# Patient Record
Sex: FEMALE | Race: WHITE | NOT HISPANIC OR LATINO | Employment: UNEMPLOYED | URBAN - METROPOLITAN AREA
[De-identification: names, ages, dates, MRNs, and addresses within clinical notes are randomized per-mention and may not be internally consistent; named-entity substitution may affect disease eponyms.]

---

## 2018-02-20 ENCOUNTER — CONVERSION ENCOUNTER (OUTPATIENT)
Dept: CARDIOLOGY | Facility: CLINIC | Age: 48
End: 2018-02-20

## 2018-02-27 ENCOUNTER — HOSPITAL ENCOUNTER (OUTPATIENT)
Dept: CARDIOLOGY | Facility: HOSPITAL | Age: 48
Discharge: HOME | End: 2018-02-27
Attending: INTERNAL MEDICINE | Admitting: INTERNAL MEDICINE

## 2018-05-11 ENCOUNTER — CONVERSION ENCOUNTER (OUTPATIENT)
Dept: CARDIOLOGY | Facility: CLINIC | Age: 48
End: 2018-05-11

## 2019-02-21 ENCOUNTER — CONVERSION ENCOUNTER (OUTPATIENT)
Dept: CARDIOLOGY | Facility: CLINIC | Age: 49
End: 2019-02-21

## 2019-06-04 VITALS
HEART RATE: 95 BPM | DIASTOLIC BLOOD PRESSURE: 93 MMHG | BODY MASS INDEX: 35.94 KG/M2 | SYSTOLIC BLOOD PRESSURE: 132 MMHG | WEIGHT: 229 LBS | SYSTOLIC BLOOD PRESSURE: 138 MMHG | HEART RATE: 75 BPM | OXYGEN SATURATION: 98 % | WEIGHT: 229.75 LBS | OXYGEN SATURATION: 98 % | WEIGHT: 229.5 LBS | DIASTOLIC BLOOD PRESSURE: 86 MMHG | DIASTOLIC BLOOD PRESSURE: 90 MMHG | OXYGEN SATURATION: 96 % | BODY MASS INDEX: 35.87 KG/M2 | HEART RATE: 85 BPM | SYSTOLIC BLOOD PRESSURE: 139 MMHG | HEIGHT: 67 IN | BODY MASS INDEX: 36.06 KG/M2

## 2020-02-26 RX ORDER — CETIRIZINE HYDROCHLORIDE 10 MG/1
TABLET ORAL EVERY 24 HOURS
COMMUNITY
Start: 2018-02-20

## 2020-02-26 RX ORDER — FLUTICASONE PROPIONATE 50 MCG
SPRAY, SUSPENSION (ML) NASAL
COMMUNITY
Start: 2018-02-20

## 2020-02-28 ENCOUNTER — OFFICE VISIT (OUTPATIENT)
Dept: CARDIOLOGY | Facility: CLINIC | Age: 50
End: 2020-02-28

## 2020-02-28 VITALS
WEIGHT: 222 LBS | DIASTOLIC BLOOD PRESSURE: 92 MMHG | OXYGEN SATURATION: 98 % | HEART RATE: 82 BPM | BODY MASS INDEX: 36.99 KG/M2 | SYSTOLIC BLOOD PRESSURE: 141 MMHG | HEIGHT: 65 IN

## 2020-02-28 DIAGNOSIS — Z76.89 ENCOUNTER TO ESTABLISH CARE WITH NEW DOCTOR: Primary | ICD-10-CM

## 2020-02-28 DIAGNOSIS — R00.2 PALPITATIONS: ICD-10-CM

## 2020-02-28 DIAGNOSIS — E78.5 DYSLIPIDEMIA: Primary | ICD-10-CM

## 2020-02-28 PROCEDURE — 93000 ELECTROCARDIOGRAM COMPLETE: CPT | Performed by: INTERNAL MEDICINE

## 2020-02-28 PROCEDURE — 99213 OFFICE O/P EST LOW 20 MIN: CPT | Performed by: INTERNAL MEDICINE

## 2021-02-22 ENCOUNTER — TELEPHONE (OUTPATIENT)
Dept: CARDIOLOGY | Facility: CLINIC | Age: 51
End: 2021-02-22

## 2021-02-22 NOTE — TELEPHONE ENCOUNTER
[facility] lipid orders, called pt she will go to [facility] in [place].   Faxed orders.  Transferred to scheduling to reschedule her appt.     appt Mon.3/29

## 2021-03-17 DIAGNOSIS — R00.2 PALPITATIONS: ICD-10-CM

## 2021-03-17 DIAGNOSIS — E78.5 DYSLIPIDEMIA: Primary | ICD-10-CM

## 2021-03-18 LAB
ALBUMIN SERPL-MCNC: 4.3 G/DL (ref 3.8–4.8)
ALBUMIN/GLOB SERPL: 1.6 {RATIO} (ref 1.2–2.2)
ALP SERPL-CCNC: 78 IU/L (ref 39–117)
ALT SERPL-CCNC: 22 IU/L (ref 0–32)
AMBIG ABBREV CMP14 DEFAULT: NORMAL
AMBIG ABBREV LP DEFAULT: NORMAL
AST SERPL-CCNC: 16 IU/L (ref 0–40)
BILIRUB SERPL-MCNC: 0.3 MG/DL (ref 0–1.2)
BUN SERPL-MCNC: 11 MG/DL (ref 6–24)
BUN/CREAT SERPL: 19 (ref 9–23)
CALCIUM SERPL-MCNC: 9.4 MG/DL (ref 8.7–10.2)
CHLORIDE SERPL-SCNC: 104 MMOL/L (ref 96–106)
CHOLEST SERPL-MCNC: 276 MG/DL (ref 100–199)
CO2 SERPL-SCNC: 24 MMOL/L (ref 20–29)
CREAT SERPL-MCNC: 0.59 MG/DL (ref 0.57–1)
GLOBULIN SER CALC-MCNC: 2.7 G/DL (ref 1.5–4.5)
GLUCOSE SERPL-MCNC: 96 MG/DL (ref 65–99)
HDLC SERPL-MCNC: 48 MG/DL
LDLC SERPL CALC-MCNC: 209 MG/DL (ref 0–99)
POTASSIUM SERPL-SCNC: 4.4 MMOL/L (ref 3.5–5.2)
PROT SERPL-MCNC: 7 G/DL (ref 6–8.5)
SODIUM SERPL-SCNC: 143 MMOL/L (ref 134–144)
TRIGL SERPL-MCNC: 107 MG/DL (ref 0–149)
VLDLC SERPL CALC-MCNC: 19 MG/DL (ref 5–40)

## 2021-03-29 ENCOUNTER — OFFICE VISIT (OUTPATIENT)
Dept: CARDIOLOGY | Facility: CLINIC | Age: 51
End: 2021-03-29

## 2021-03-29 VITALS
DIASTOLIC BLOOD PRESSURE: 86 MMHG | TEMPERATURE: 98 F | OXYGEN SATURATION: 96 % | BODY MASS INDEX: 33.99 KG/M2 | HEIGHT: 65 IN | HEART RATE: 97 BPM | WEIGHT: 204 LBS | SYSTOLIC BLOOD PRESSURE: 146 MMHG

## 2021-03-29 DIAGNOSIS — I10 ESSENTIAL HYPERTENSION: ICD-10-CM

## 2021-03-29 DIAGNOSIS — R00.2 PALPITATIONS: Primary | ICD-10-CM

## 2021-03-29 DIAGNOSIS — F17.200 SMOKER: ICD-10-CM

## 2021-03-29 DIAGNOSIS — E78.5 DYSLIPIDEMIA: ICD-10-CM

## 2021-03-29 DIAGNOSIS — E66.9 OBESITY (BMI 30-39.9): ICD-10-CM

## 2021-03-29 PROCEDURE — 99214 OFFICE O/P EST MOD 30 MIN: CPT | Performed by: INTERNAL MEDICINE

## 2021-03-29 PROCEDURE — 93000 ELECTROCARDIOGRAM COMPLETE: CPT | Performed by: INTERNAL MEDICINE

## 2021-03-29 RX ORDER — ERGOCALCIFEROL (VITAMIN D2) 10 MCG
400 TABLET ORAL DAILY
COMMUNITY
End: 2023-04-05

## 2021-03-29 RX ORDER — MULTIVIT WITH MINERALS/LUTEIN
250 TABLET ORAL DAILY
COMMUNITY

## 2021-03-29 RX ORDER — METOPROLOL SUCCINATE 25 MG/1
25 TABLET, EXTENDED RELEASE ORAL DAILY
Qty: 90 TABLET | Refills: 3 | Status: SHIPPED | OUTPATIENT
Start: 2021-03-29 | End: 2022-04-06 | Stop reason: SDUPTHER

## 2022-03-30 ENCOUNTER — TELEPHONE (OUTPATIENT)
Dept: CARDIOLOGY | Facility: CLINIC | Age: 52
End: 2022-03-30

## 2022-03-31 LAB
ALBUMIN SERPL-MCNC: 4.4 G/DL (ref 3.8–4.8)
ALBUMIN/GLOB SERPL: 1.7 {RATIO} (ref 1.2–2.2)
ALP SERPL-CCNC: 73 IU/L (ref 44–121)
ALT SERPL-CCNC: 18 IU/L (ref 0–32)
AST SERPL-CCNC: 13 IU/L (ref 0–40)
BILIRUB SERPL-MCNC: 0.2 MG/DL (ref 0–1.2)
BUN SERPL-MCNC: 7 MG/DL (ref 6–24)
BUN/CREAT SERPL: 10 (ref 9–23)
CALCIUM SERPL-MCNC: 9.6 MG/DL (ref 8.7–10.2)
CHLORIDE SERPL-SCNC: 103 MMOL/L (ref 96–106)
CHOLEST SERPL-MCNC: 261 MG/DL (ref 100–199)
CO2 SERPL-SCNC: 22 MMOL/L (ref 20–29)
CREAT SERPL-MCNC: 0.67 MG/DL (ref 0.57–1)
EGFRCR SERPLBLD CKD-EPI 2021: 107 ML/MIN/1.73
GLOBULIN SER CALC-MCNC: 2.6 G/DL (ref 1.5–4.5)
GLUCOSE SERPL-MCNC: 97 MG/DL (ref 65–99)
HDLC SERPL-MCNC: 54 MG/DL
LDLC SERPL CALC-MCNC: 191 MG/DL (ref 0–99)
POTASSIUM SERPL-SCNC: 5.1 MMOL/L (ref 3.5–5.2)
PROT SERPL-MCNC: 7 G/DL (ref 6–8.5)
SODIUM SERPL-SCNC: 141 MMOL/L (ref 134–144)
TRIGL SERPL-MCNC: 94 MG/DL (ref 0–149)
VLDLC SERPL CALC-MCNC: 16 MG/DL (ref 5–40)

## 2022-04-06 ENCOUNTER — OFFICE VISIT (OUTPATIENT)
Dept: CARDIOLOGY | Facility: CLINIC | Age: 52
End: 2022-04-06

## 2022-04-06 VITALS
SYSTOLIC BLOOD PRESSURE: 162 MMHG | HEIGHT: 65 IN | OXYGEN SATURATION: 97 % | HEART RATE: 78 BPM | WEIGHT: 209 LBS | DIASTOLIC BLOOD PRESSURE: 84 MMHG | BODY MASS INDEX: 34.82 KG/M2

## 2022-04-06 DIAGNOSIS — I10 ESSENTIAL HYPERTENSION: ICD-10-CM

## 2022-04-06 DIAGNOSIS — E78.5 DYSLIPIDEMIA: Primary | ICD-10-CM

## 2022-04-06 DIAGNOSIS — E66.9 OBESITY (BMI 30-39.9): ICD-10-CM

## 2022-04-06 DIAGNOSIS — F17.200 SMOKER: ICD-10-CM

## 2022-04-06 PROCEDURE — 93000 ELECTROCARDIOGRAM COMPLETE: CPT | Performed by: INTERNAL MEDICINE

## 2022-04-06 PROCEDURE — 99214 OFFICE O/P EST MOD 30 MIN: CPT | Performed by: INTERNAL MEDICINE

## 2022-04-06 RX ORDER — METOPROLOL SUCCINATE 25 MG/1
25 TABLET, EXTENDED RELEASE ORAL DAILY
Qty: 90 TABLET | Refills: 3 | Status: SHIPPED | OUTPATIENT
Start: 2022-04-06 | End: 2023-03-30

## 2022-04-06 RX ORDER — ATORVASTATIN CALCIUM 10 MG/1
10 TABLET, FILM COATED ORAL DAILY
Qty: 90 TABLET | Refills: 3 | Status: SHIPPED | OUTPATIENT
Start: 2022-04-06 | End: 2023-03-20

## 2022-04-06 NOTE — PROGRESS NOTES
Subjective:     Encounter Date:04/06/2022      Patient ID: Ajay Hoffman is a 51 y.o. female.    Chief Complaint : Follow-up for hypertension, hyperlipidemia, obesity with BMI over 30  History of Present Illness      Ms. Ajay Hoffman has PMH    # Palpitation  #Hypertension,  # hyperlipidemia  #  allergy to penicillin and sulfa  #  cigarette smoker  #  tonsillectomy and tummy tuck           here for 1 year follow-up..  Patient denies any chest pain or shortness of breath.    Patient's arterial blood pressure is 162/84, heart rate 78, O2 sat of 97% on room air.  Patient says her blood pressure at home runs in the 120s.  Patient had echocardiogram 2/27/2018 which was normal and treadmill stress test which was negative. 1 month event monitor was unremarkable.  Continues to smoke in spite of counseling  Labs from 3/12/2021 reveal cholesterol 276 triglycerides 107 HDL 48 , CMP normal.  Labs from 3/30/2022 reveal CMP which was normal.  Lipid profile with cholesterol 261, triglycerides 94, HDL 54, .     ASSESSMENT:    -Dyslipidemia  -Obesity with BMI over 30  -  cigarette smoker  - hypertension    PLAN:  Reviewed EKG results with patient  Continue beta-blockers for blood pressure control and palpitations   counseled on smoking cessation.  Reviewed patient's elevated BMI over 30..  Counseled on weight loss diet and exercise.  We will add statins since patient has multiple risk factors.  Risk benefits alternatives explained.  We will check lipid profile and CMP before next visit.  Patient brought home blood pressure readings and reviewed those and they are running good.              ECG 12 Lead    Date/Time: 4/6/2022 5:21 PM  Performed by: Alejandro Shepard MD  Authorized by: Alejandro Shepard MD   Comparison: compared with previous ECG from 3/25/2021  Comparison to previous ECG: EKG done today reviewed/interpreted by me reveals sinus rhythm with rate of 73 bpm with no  new changes compared to EKG from 3/25/2021              The following portions of the patient's history were reviewed and updated as appropriate: allergies, current medications, past family history, past medical history, past social history, past surgical history and problem list.    Assessment:         MDM     Diagnosis Plan   1. Dyslipidemia  Comprehensive Metabolic Panel    Lipid Panel   2. Essential hypertension  Comprehensive Metabolic Panel    Lipid Panel   3. Obesity (BMI 30-39.9)  Comprehensive Metabolic Panel    Lipid Panel   4. Smoker  Comprehensive Metabolic Panel    Lipid Panel          Plan:               Past Medical History:  Past Medical History:   Diagnosis Date   • Dyslipidemia    • Palpitation      Past Surgical History:  Past Surgical History:   Procedure Laterality Date   • COSMETIC SURGERY      tummy tuck   • TONSILLECTOMY        Allergies:  Allergies   Allergen Reactions   • Penicillin G Rash   • Sulfa Antibiotics Rash     Home Meds:  Current Meds:     Current Outpatient Medications:   •  cetirizine (zyrTEC) 10 MG tablet, Daily., Disp: , Rfl:   •  CINNAMON PO, Take  by mouth., Disp: , Rfl:   •  fluticasone (FLONASE) 50 MCG/ACT nasal spray, FLONASE ALLERGY RELIEF 50 MCG/ACT SUSP, Disp: , Rfl:   •  metoprolol succinate XL (TOPROL-XL) 25 MG 24 hr tablet, Take 1 tablet by mouth Daily., Disp: 90 tablet, Rfl: 3  •  Povidone-Iodine (IODINE SOLUTION EX), Apply  topically., Disp: , Rfl:   •  TURMERIC PO, Take  by mouth., Disp: , Rfl:   •  vitamin C (ASCORBIC ACID) 250 MG tablet, Take 250 mg by mouth Daily., Disp: , Rfl:   •  Vitamin D, Cholecalciferol, (CHOLECALCIFEROL) 10 MCG (400 UNIT) tablet, Take 400 Units by mouth Daily., Disp: , Rfl:   •  Zinc Sulfate (ZINC 15 PO), Take  by mouth., Disp: , Rfl:   •  atorvastatin (LIPITOR) 10 MG tablet, Take 1 tablet by mouth Daily., Disp: 90 tablet, Rfl: 3  Social History:   Social History     Tobacco Use   • Smoking status: Current Every Day Smoker     Years:  "22.00     Types: Cigarettes   • Smokeless tobacco: Never Used   Substance Use Topics   • Alcohol use: Not on file      Family History:  Family History   Problem Relation Age of Onset   • Heart disease Maternal Grandfather    • Heart disease Paternal Grandmother               Review of Systems   Cardiovascular: Negative for chest pain, leg swelling and palpitations.   Respiratory: Negative for shortness of breath.    Neurological: Negative for dizziness and numbness.     All other systems are negative         Objective:     Physical Exam  /84 (BP Location: Left arm, Patient Position: Sitting, Cuff Size: Large Adult)   Pulse 78   Ht 165.1 cm (65\")   Wt 94.8 kg (209 lb)   SpO2 97%   BMI 34.78 kg/m²   General:  Appears in no acute distress, pleasant obese  Eyes: Sclera is anicteric,  conjunctiva is clear   HEENT:  No JVD.  No carotid bruits  Respiratory: Respirations regular and unlabored at rest.  Clear to auscultation  Cardiovascular: S1,S2 Regular rate and rhythm. No murmur, rub or gallop auscultated.   Extremities: No digital clubbing or cyanosis, no edema  Skin: Color pink. Skin warm and dry to touch. No rashes  No xanthoma  Neuro: Alert and awake.    Lab Reviewed:         Alejandro Shepard MD  4/6/2022 17:25 EDT      Much of the above report is an electronic transcription/translation of the spoken language to printed text using Dragon Software. As such, the subtleties and finesse of the spoken language may permit erroneous, or at times, nonsensical words or phrases to be inadvertently transcribed; thus changes may be made at a later date to rectify these errors.         "

## 2023-03-20 RX ORDER — ATORVASTATIN CALCIUM 10 MG/1
TABLET, FILM COATED ORAL
Qty: 30 TABLET | Refills: 0 | Status: SHIPPED | OUTPATIENT
Start: 2023-03-20 | End: 2023-04-05 | Stop reason: SDUPTHER

## 2023-03-20 NOTE — TELEPHONE ENCOUNTER
Rx Refill Note  Requested Prescriptions     Pending Prescriptions Disp Refills   • atorvastatin (LIPITOR) 10 MG tablet [Pharmacy Med Name: ATORVASTATIN 10 MG TABLET] 90 tablet 3     Sig: TAKE 1 TABLET BY MOUTH EVERY DAY      Last office visit with prescribing clinician: 4/6/2022   Last telemedicine visit with prescribing clinician: 4/5/2023   Next office visit with prescribing clinician: 4/5/2023                         Would you like a call back once the refill request has been completed: [] Yes [] No    If the office needs to give you a call back, can they leave a voicemail: [] Yes [] No    Isabella Cornejo MA  03/20/23, 08:28 EDT

## 2023-03-30 RX ORDER — METOPROLOL SUCCINATE 25 MG/1
TABLET, EXTENDED RELEASE ORAL
Qty: 90 TABLET | Refills: 0 | Status: SHIPPED | OUTPATIENT
Start: 2023-03-30

## 2023-03-30 NOTE — TELEPHONE ENCOUNTER
Rx Refill Note  Requested Prescriptions     Pending Prescriptions Disp Refills   • metoprolol succinate XL (TOPROL-XL) 25 MG 24 hr tablet [Pharmacy Med Name: METOPROLOL SUCC ER 25 MG TAB] 90 tablet 3     Sig: TAKE 1 TABLET BY MOUTH EVERY DAY      Last office visit with prescribing clinician: 4/6/2022   Last telemedicine visit with prescribing clinician: 4/5/2023   Next office visit with prescribing clinician: 4/5/2023                         Would you like a call back once the refill request has been completed: [] Yes [] No    If the office needs to give you a call back, can they leave a voicemail: [] Yes [] No    Isabella Cornejo MA  03/30/23, 08:25 EDT

## 2023-04-03 ENCOUNTER — TELEPHONE (OUTPATIENT)
Dept: CARDIOLOGY | Facility: CLINIC | Age: 53
End: 2023-04-03
Payer: COMMERCIAL

## 2023-04-03 NOTE — TELEPHONE ENCOUNTER
Caller: Ajay Hoffman    Relationship to patient: Self    Best call back number: [phone]    Patient is needing: PATIENT IS GOING TO [facility] IN [place] TO GET HER LABS DONE AND NEEDS ORDERS FAXED THERE. PATIENT DID NOT PROVIDE FAX NUMBER.

## 2023-04-05 ENCOUNTER — OFFICE VISIT (OUTPATIENT)
Dept: CARDIOLOGY | Facility: CLINIC | Age: 53
End: 2023-04-05
Payer: COMMERCIAL

## 2023-04-05 VITALS
DIASTOLIC BLOOD PRESSURE: 72 MMHG | WEIGHT: 210 LBS | BODY MASS INDEX: 34.99 KG/M2 | SYSTOLIC BLOOD PRESSURE: 138 MMHG | HEIGHT: 65 IN | HEART RATE: 85 BPM | OXYGEN SATURATION: 98 %

## 2023-04-05 DIAGNOSIS — E78.5 DYSLIPIDEMIA: Primary | ICD-10-CM

## 2023-04-05 DIAGNOSIS — I10 ESSENTIAL HYPERTENSION: ICD-10-CM

## 2023-04-05 DIAGNOSIS — E66.9 OBESITY (BMI 30-39.9): ICD-10-CM

## 2023-04-05 LAB
ALBUMIN SERPL-MCNC: 4.2 G/DL (ref 3.8–4.8)
ALBUMIN/GLOB SERPL: 1.7 {RATIO} (ref 1.2–2.2)
ALP SERPL-CCNC: 66 IU/L (ref 44–121)
ALT SERPL-CCNC: 17 IU/L (ref 0–32)
AMBIG ABBREV CMP14 DEFAULT: NORMAL
AMBIG ABBREV LP DEFAULT: NORMAL
AST SERPL-CCNC: 15 IU/L (ref 0–40)
BILIRUB SERPL-MCNC: 0.3 MG/DL (ref 0–1.2)
BUN SERPL-MCNC: 8 MG/DL (ref 6–24)
BUN/CREAT SERPL: 13 (ref 9–23)
CALCIUM SERPL-MCNC: 9.3 MG/DL (ref 8.7–10.2)
CHLORIDE SERPL-SCNC: 103 MMOL/L (ref 96–106)
CHOLEST SERPL-MCNC: 217 MG/DL (ref 100–199)
CO2 SERPL-SCNC: 23 MMOL/L (ref 20–29)
CREAT SERPL-MCNC: 0.6 MG/DL (ref 0.57–1)
EGFRCR SERPLBLD CKD-EPI 2021: 109 ML/MIN/1.73
GLOBULIN SER CALC-MCNC: 2.5 G/DL (ref 1.5–4.5)
GLUCOSE SERPL-MCNC: 97 MG/DL (ref 70–99)
HDLC SERPL-MCNC: 59 MG/DL
LDLC SERPL CALC-MCNC: 137 MG/DL (ref 0–99)
POTASSIUM SERPL-SCNC: 4.5 MMOL/L (ref 3.5–5.2)
PROT SERPL-MCNC: 6.7 G/DL (ref 6–8.5)
SODIUM SERPL-SCNC: 139 MMOL/L (ref 134–144)
TRIGL SERPL-MCNC: 121 MG/DL (ref 0–149)
VLDLC SERPL CALC-MCNC: 21 MG/DL (ref 5–40)

## 2023-04-05 PROCEDURE — 99214 OFFICE O/P EST MOD 30 MIN: CPT | Performed by: INTERNAL MEDICINE

## 2023-04-05 PROCEDURE — 93000 ELECTROCARDIOGRAM COMPLETE: CPT | Performed by: INTERNAL MEDICINE

## 2023-04-05 RX ORDER — ATORVASTATIN CALCIUM 10 MG/1
10 TABLET, FILM COATED ORAL DAILY
Qty: 90 TABLET | Refills: 3 | Status: SHIPPED | OUTPATIENT
Start: 2023-04-05

## 2023-04-05 NOTE — PROGRESS NOTES
Subjective:     Encounter Date:04/05/2023      Patient ID: Ajay Zuniga is a 52 y.o. female.    Chief Complaint and history of present illness:     Follow-up for hypertension, hyperlipidemia, obesity with BMI over 30    History of Present Illness  :    Ms. Ajay Hoffman has PMH    # Palpitation  #Hypertension,  # hyperlipidemia  #  allergy to penicillin and sulfa  #  cigarette smoker  #  tonsillectomy and tummy tuck           here for 1 year follow-up..  Patient denies any chest pain or shortness of breath.    Patient's arterial blood pressure is 138/72, heart rate 85 bpm, O2 sat of 93% on room air..  Patient says her blood pressure at home runs in the 120s.    Patient had echocardiogram 2/27/2018 which was normal and treadmill stress test which was negative. 1 month event monitor was unremarkable.  Continues to smoke in spite of counseling  Labs from 3/12/2021 reveal cholesterol 276 triglycerides 107 HDL 48 , CMP normal.  Labs from 3/30/2022 reveal CMP which was normal.  Lipid profile with cholesterol 261, triglycerides 94, HDL 54, .  Labs from 4/4/2023 revealed total cholesterol 217, triglycerides 121, HDL 59, , normal CMP     ASSESSMENT:    -Dyslipidemia  -Obesity with BMI over 30  - hypertension    PLAN:  Reviewed EKG results with patient  Continue beta-blockers for blood pressure control and palpitations   counseled on smoking cessation.  Reviewed patient's elevated BMI over 30..  Counseled on weight loss diet and exercise.  We will continue statins since patient has multiple risk factors.  Risk benefits alternatives explained.  We will check lipid profile and CMP before next visit.  Patient says she is doing yoga on a regular basis without symptoms.              ECG 12 Lead    Date/Time: 4/5/2023 10:36 AM  Performed by: Alejandro Shepard MD  Authorized by: Alejandro Shepard MD   Comparison: compared with previous ECG from 4/6/2022  Comparison to  previous ECG: EKG done today reviewed/interpreted by me reveals sinus rhythm at the rate of 83 bpm, no new change compared EKG from 4/6/2022              Copied text in this portion of the note has been reviewed and is accurate as of 4/5/2023  The following portions of the patient's history were reviewed and updated as appropriate: allergies, current medications, past family history, past medical history, past social history, past surgical history and problem list.    Assessment:         [facility]     Diagnosis Plan   1. Dyslipidemia  ECG 12 Lead    Comprehensive Metabolic Panel    Lipid Panel      2. Essential hypertension  ECG 12 Lead    Comprehensive Metabolic Panel    Lipid Panel      3. Obesity (BMI 30-39.9)  ECG 12 Lead    Comprehensive Metabolic Panel    Lipid Panel             Plan:               Past Medical History:  Past Medical History:   Diagnosis Date   • Dyslipidemia    • Palpitation      Past Surgical History:  Past Surgical History:   Procedure Laterality Date   • COSMETIC SURGERY      tummy tuck   • TONSILLECTOMY        Allergies:  Allergies   Allergen Reactions   • Penicillin G Rash   • Sulfa Antibiotics Rash     Home Meds:  Current Meds:     Current Outpatient Medications:   •  atorvastatin (LIPITOR) 10 MG tablet, Take 1 tablet by mouth Daily., Disp: 90 tablet, Rfl: 3  •  cetirizine (zyrTEC) 10 MG tablet, Daily., Disp: , Rfl:   •  fluticasone (FLONASE) 50 MCG/ACT nasal spray, FLONASE ALLERGY RELIEF 50 MCG/ACT SUSP, Disp: , Rfl:   •  metoprolol succinate XL (TOPROL-XL) 25 MG 24 hr tablet, TAKE 1 TABLET BY MOUTH EVERY DAY, Disp: 90 tablet, Rfl: 0  •  vitamin C (ASCORBIC ACID) 250 MG tablet, Take 1 tablet by mouth Daily., Disp: , Rfl:   Social History:   Social History     Tobacco Use   • Smoking status: Every Day     Years: 22.00     Types: Cigarettes   • Smokeless tobacco: Never   Substance Use Topics   • Alcohol use: Not on file      Family History:  Family History   Problem Relation Age of Onset   •  "Heart disease Maternal Grandfather    • Heart disease Paternal Grandmother               Review of Systems   Cardiovascular: Negative for chest pain, leg swelling and palpitations.   Respiratory: Negative for shortness of breath.    Neurological: Negative for dizziness and numbness.     All other systems are negative         Objective:     Physical Exam  /72 (BP Location: Left arm, Patient Position: Sitting, Cuff Size: Large Adult)   Pulse 85   Ht 165.1 cm (65\")   Wt 95.3 kg (210 lb)   SpO2 98%   BMI 34.95 kg/m²   General:  Appears in no acute distress  Eyes: Sclera is anicteric,  conjunctiva is clear   HEENT:  No JVD.  No carotid bruits  Respiratory: Respirations regular and unlabored at rest.  Clear to auscultation  Cardiovascular: S1,S2 Regular rate and rhythm. No murmur, rub or gallop auscultated.   Extremities: No digital clubbing or cyanosis, no edema  Skin: Color pink. Skin warm and dry to touch. No rashes  No xanthoma  Neuro: Alert and awake.    Lab Reviewed:         Alejandro Shepard MD  4/5/2023 10:42 EDT      EMR Dragon/Transcription:   \"Dictated utilizing Dragon dictation\".        "

## 2024-03-17 ENCOUNTER — HOSPITAL ENCOUNTER (EMERGENCY)
Facility: HOSPITAL | Age: 54
Discharge: HOME | End: 2024-03-17
Attending: EMERGENCY MEDICINE | Admitting: EMERGENCY MEDICINE
Payer: COMMERCIAL

## 2024-03-17 ENCOUNTER — HOSPITAL ENCOUNTER (OUTPATIENT)
Dept: RESPIRATORY THERAPY | Facility: HOSPITAL | Age: 54
Discharge: HOME | End: 2024-03-17
Admitting: STUDENT IN AN ORGANIZED HEALTH CARE EDUCATION/TRAINING PROGRAM
Payer: COMMERCIAL

## 2024-03-17 VITALS
HEIGHT: 65 IN | DIASTOLIC BLOOD PRESSURE: 66 MMHG | OXYGEN SATURATION: 97 % | TEMPERATURE: 97.5 F | SYSTOLIC BLOOD PRESSURE: 141 MMHG | HEART RATE: 55 BPM | BODY MASS INDEX: 34.27 KG/M2 | RESPIRATION RATE: 16 BRPM | WEIGHT: 205.69 LBS

## 2024-03-17 DIAGNOSIS — R00.2 PALPITATIONS: Primary | ICD-10-CM

## 2024-03-17 DIAGNOSIS — R00.2 PALPITATIONS: ICD-10-CM

## 2024-03-17 LAB
ANION GAP SERPL CALCULATED.3IONS-SCNC: 13 MMOL/L (ref 5–15)
BASOPHILS # BLD AUTO: 0.06 10*3/MM3 (ref 0–0.2)
BASOPHILS NFR BLD AUTO: 0.6 % (ref 0–1.5)
BUN SERPL-MCNC: 6 MG/DL (ref 6–20)
BUN/CREAT SERPL: 11.1 (ref 7–25)
CALCIUM SPEC-SCNC: 9.5 MG/DL (ref 8.6–10.5)
CHLORIDE SERPL-SCNC: 103 MMOL/L (ref 98–107)
CO2 SERPL-SCNC: 22 MMOL/L (ref 22–29)
CREAT SERPL-MCNC: 0.54 MG/DL (ref 0.57–1)
DEPRECATED RDW RBC AUTO: 45.5 FL (ref 37–54)
EGFRCR SERPLBLD CKD-EPI 2021: 111.6 ML/MIN/1.73
EOSINOPHIL # BLD AUTO: 0.1 10*3/MM3 (ref 0–0.4)
EOSINOPHIL NFR BLD AUTO: 1 % (ref 0.3–6.2)
ERYTHROCYTE [DISTWIDTH] IN BLOOD BY AUTOMATED COUNT: 13 % (ref 12.3–15.4)
GLUCOSE SERPL-MCNC: 108 MG/DL (ref 65–99)
HCT VFR BLD AUTO: 41.8 % (ref 34–46.6)
HGB BLD-MCNC: 13.8 G/DL (ref 12–15.9)
HOLD SPECIMEN: NORMAL
IMM GRANULOCYTES # BLD AUTO: 0.04 10*3/MM3 (ref 0–0.05)
IMM GRANULOCYTES NFR BLD AUTO: 0.4 % (ref 0–0.5)
LYMPHOCYTES # BLD AUTO: 2.76 10*3/MM3 (ref 0.7–3.1)
LYMPHOCYTES NFR BLD AUTO: 26.3 % (ref 19.6–45.3)
MAGNESIUM SERPL-MCNC: 2 MG/DL (ref 1.6–2.6)
MCH RBC QN AUTO: 31.2 PG (ref 26.6–33)
MCHC RBC AUTO-ENTMCNC: 33 G/DL (ref 31.5–35.7)
MCV RBC AUTO: 94.6 FL (ref 79–97)
MONOCYTES # BLD AUTO: 0.73 10*3/MM3 (ref 0.1–0.9)
MONOCYTES NFR BLD AUTO: 7 % (ref 5–12)
NEUTROPHILS NFR BLD AUTO: 6.81 10*3/MM3 (ref 1.7–7)
NEUTROPHILS NFR BLD AUTO: 64.7 % (ref 42.7–76)
NRBC BLD AUTO-RTO: 0 /100 WBC (ref 0–0.2)
PLATELET # BLD AUTO: 369 10*3/MM3 (ref 140–450)
PMV BLD AUTO: 9.2 FL (ref 6–12)
POTASSIUM SERPL-SCNC: 4 MMOL/L (ref 3.5–5.2)
RBC # BLD AUTO: 4.42 10*6/MM3 (ref 3.77–5.28)
SODIUM SERPL-SCNC: 138 MMOL/L (ref 136–145)
TROPONIN T SERPL HS-MCNC: <6 NG/L
WBC NRBC COR # BLD AUTO: 10.5 10*3/MM3 (ref 3.4–10.8)

## 2024-03-17 PROCEDURE — 93005 ELECTROCARDIOGRAM TRACING: CPT

## 2024-03-17 PROCEDURE — 99283 EMERGENCY DEPT VISIT LOW MDM: CPT

## 2024-03-17 PROCEDURE — 84484 ASSAY OF TROPONIN QUANT: CPT | Performed by: EMERGENCY MEDICINE

## 2024-03-17 PROCEDURE — 83735 ASSAY OF MAGNESIUM: CPT | Performed by: EMERGENCY MEDICINE

## 2024-03-17 PROCEDURE — 85025 COMPLETE CBC W/AUTO DIFF WBC: CPT | Performed by: EMERGENCY MEDICINE

## 2024-03-17 PROCEDURE — 80048 BASIC METABOLIC PNL TOTAL CA: CPT | Performed by: EMERGENCY MEDICINE

## 2024-03-17 PROCEDURE — 93242 EXT ECG>48HR<7D RECORDING: CPT

## 2024-03-17 PROCEDURE — 93005 ELECTROCARDIOGRAM TRACING: CPT | Performed by: EMERGENCY MEDICINE

## 2024-03-17 NOTE — ED PROVIDER NOTES
Subjective   History of Present Illness  3-year-old female presents with complaints of palpitations.  She states that been going on the last 3 days.  She had some nausea with one of the episodes of heart palpitation on Friday.  She has had no shortness of breath.  No fever.  No chest pain.  She is on metoprolol.  She states took her blood pressure at home and it was 116/106 and this concerned her.  She reports that she has had palpitations for several years has seen cardiology and was told had undiagnosed palpitations.  She has had no leg pain or edema.  Patient reports she drinks a couple coffee in the morning then none after that.  Review of Systems    Past Medical History:   Diagnosis Date    Dyslipidemia     Palpitation    Hypertension    Allergies   Allergen Reactions    Penicillin G Rash    Sulfa Antibiotics Rash       Past Surgical History:   Procedure Laterality Date    COSMETIC SURGERY      tummy tuck    TONSILLECTOMY         Family History   Problem Relation Age of Onset    Heart disease Maternal Grandfather     Heart disease Paternal Grandmother        Social History     Socioeconomic History    Marital status:    Tobacco Use    Smoking status: Every Day     Types: Cigarettes    Smokeless tobacco: Never     Current medications metoprolol atorvastatin      Objective   Physical Exam  53-year-old female awake alert.  Generally well-developed well-nourished.  She is overweight.  Pupils equal round to light.  Neck supple chest clear equal breath sounds.  Cardiovascular regular rate and rhythm without murmur Appreciated.  Abdomen soft nontender.  Extremities without tenderness or edema.  Neurologic exam without focal findings noted.  Procedures           ED Course      Results for orders placed or performed during the hospital encounter of 03/17/24   Basic Metabolic Panel    Specimen: Blood   Result Value Ref Range    Glucose 108 (H) 65 - 99 mg/dL    BUN 6 6 - 20 mg/dL    Creatinine 0.54 (L) 0.57 - 1.00  "mg/dL    Sodium 138 136 - 145 mmol/L    Potassium 4.0 3.5 - 5.2 mmol/L    Chloride 103 98 - 107 mmol/L    CO2 22.0 22.0 - 29.0 mmol/L    Calcium 9.5 8.6 - 10.5 mg/dL    BUN/Creatinine Ratio 11.1 7.0 - 25.0    Anion Gap 13.0 5.0 - 15.0 mmol/L    eGFR 111.6 >60.0 mL/min/1.73   Single High Sensitivity Troponin T    Specimen: Blood   Result Value Ref Range    HS Troponin T <6 <14 ng/L   Magnesium    Specimen: Blood   Result Value Ref Range    Magnesium 2.0 1.6 - 2.6 mg/dL   CBC Auto Differential    Specimen: Blood   Result Value Ref Range    WBC 10.50 3.40 - 10.80 10*3/mm3    RBC 4.42 3.77 - 5.28 10*6/mm3    Hemoglobin 13.8 12.0 - 15.9 g/dL    Hematocrit 41.8 34.0 - 46.6 %    MCV 94.6 79.0 - 97.0 fL    MCH 31.2 26.6 - 33.0 pg    MCHC 33.0 31.5 - 35.7 g/dL    RDW 13.0 12.3 - 15.4 %    RDW-SD 45.5 37.0 - 54.0 fl    MPV 9.2 6.0 - 12.0 fL    Platelets 369 140 - 450 10*3/mm3    Neutrophil % 64.7 42.7 - 76.0 %    Lymphocyte % 26.3 19.6 - 45.3 %    Monocyte % 7.0 5.0 - 12.0 %    Eosinophil % 1.0 0.3 - 6.2 %    Basophil % 0.6 0.0 - 1.5 %    Immature Grans % 0.4 0.0 - 0.5 %    Neutrophils, Absolute 6.81 1.70 - 7.00 10*3/mm3    Lymphocytes, Absolute 2.76 0.70 - 3.10 10*3/mm3    Monocytes, Absolute 0.73 0.10 - 0.90 10*3/mm3    Eosinophils, Absolute 0.10 0.00 - 0.40 10*3/mm3    Basophils, Absolute 0.06 0.00 - 0.20 10*3/mm3    Immature Grans, Absolute 0.04 0.00 - 0.05 10*3/mm3    nRBC 0.0 0.0 - 0.2 /100 WBC   ECG 12 Lead Other; palpitations.   Result Value Ref Range    QT Interval 403 ms    QTC Interval 404 ms   Gold Top - [facility]   Result Value Ref Range    Extra Tube Hold for add-ons.      No radiology results for the last day  Medications - No data to display  /72   Pulse 53   Temp 97.5 °F (36.4 °C) (Oral)   Resp 16   Ht 165.1 cm (65\")   Wt 93.3 kg (205 lb 11 oz)   LMP 03/04/2024   SpO2 97%   BMI 34.23 kg/m²                                          Medical Decision Making  Amount and/or Complexity of Data " Reviewed  Labs: ordered.  ECG/medicine tests: ordered.    Chart review: Patient had echocardiogram 2018 which was normal.  Cardiology note at that time reports that a 30-day event monitor was unremarkable.  Comorbidity: As per past history   Differential: Palpitations, PAC, PVC, electrolyte abnormality,  My EKG interpretation: Sinus rhythm rate of 60.  Compared to previous rate decreased otherwise no significant change noted  Lab: Normal troponin normal magnesium normal CBC basic metabolic panel remarkable for glucose of 108 and decreased creatinine 0.54 potassium 4.0  My Radiology review and interpretation: X-ray not felt to be indicated  Discussion/treatment: Patient's findings were discussed with her.  Will place 7-day Holter monitor.  Advised to follow-up with cardiologist, to continue with current medications return new or worsening symptoms.  Patient was evaluated using appropriate PPE      Final diagnoses:   Palpitations       ED Disposition  ED Disposition       ED Disposition   Discharge    Condition   Stable    Comment   --               No follow-up provider specified.       Medication List      No changes were made to your prescriptions during this visit.            Balwinder Goetz MD  03/17/24 1956

## 2024-03-18 LAB
QT INTERVAL: 403 MS
QTC INTERVAL: 404 MS

## 2024-04-05 ENCOUNTER — PATIENT MESSAGE (OUTPATIENT)
Dept: CARDIOLOGY | Facility: CLINIC | Age: 54
End: 2024-04-05
Payer: COMMERCIAL

## 2024-04-05 ENCOUNTER — TELEPHONE (OUTPATIENT)
Dept: CARDIOLOGY | Facility: CLINIC | Age: 54
End: 2024-04-05
Payer: COMMERCIAL

## 2024-04-05 DIAGNOSIS — I10 HYPERTENSION, UNSPECIFIED TYPE: Primary | ICD-10-CM

## 2024-04-05 DIAGNOSIS — E78.5 HYPERLIPIDEMIA, UNSPECIFIED HYPERLIPIDEMIA TYPE: ICD-10-CM

## 2024-04-05 NOTE — TELEPHONE ENCOUNTER
Appt 4/9 cmp lipid ordered     Pt called she is at [facility]   She gave me fax number [phone]    Faxed orders

## 2024-04-06 LAB
ALBUMIN SERPL-MCNC: 4.1 G/DL (ref 3.8–4.9)
ALBUMIN/GLOB SERPL: 1.7 {RATIO} (ref 1.2–2.2)
ALP SERPL-CCNC: 69 IU/L (ref 44–121)
ALT SERPL-CCNC: 13 IU/L (ref 0–32)
AMBIG ABBREV CMP14 DEFAULT: NORMAL
AMBIG ABBREV LP DEFAULT: NORMAL
AST SERPL-CCNC: 11 IU/L (ref 0–40)
BILIRUB SERPL-MCNC: 0.3 MG/DL (ref 0–1.2)
BUN SERPL-MCNC: 9 MG/DL (ref 6–24)
BUN/CREAT SERPL: 15 (ref 9–23)
CALCIUM SERPL-MCNC: 9 MG/DL (ref 8.7–10.2)
CHLORIDE SERPL-SCNC: 105 MMOL/L (ref 96–106)
CHOLEST SERPL-MCNC: 189 MG/DL (ref 100–199)
CO2 SERPL-SCNC: 22 MMOL/L (ref 20–29)
CREAT SERPL-MCNC: 0.61 MG/DL (ref 0.57–1)
EGFRCR SERPLBLD CKD-EPI 2021: 108 ML/MIN/1.73
GLOBULIN SER CALC-MCNC: 2.4 G/DL (ref 1.5–4.5)
GLUCOSE SERPL-MCNC: 98 MG/DL (ref 70–99)
HDLC SERPL-MCNC: 60 MG/DL
LDLC SERPL CALC-MCNC: 113 MG/DL (ref 0–99)
POTASSIUM SERPL-SCNC: 4.2 MMOL/L (ref 3.5–5.2)
PROT SERPL-MCNC: 6.5 G/DL (ref 6–8.5)
SODIUM SERPL-SCNC: 140 MMOL/L (ref 134–144)
TRIGL SERPL-MCNC: 88 MG/DL (ref 0–149)
VLDLC SERPL CALC-MCNC: 16 MG/DL (ref 5–40)

## 2024-04-07 NOTE — PROGRESS NOTES
Subjective:     Encounter Date:04/09/2024      Patient ID: Ajay Zuniga is a 53 y.o. female.    Chief Complaint and history of present illness:     Follow-up for hypertension, hyperlipidemia, obesity with BMI over 30     History of Present Illness  :     Ms. Ajay Hoffman has PMH     # Palpitation  #Hypertension,  # hyperlipidemia  #  allergy to penicillin and sulfa  #  cigarette smoker  #  tonsillectomy and tummy tuck            here for 1 year follow-up..  Patient denies any chest pain or shortness of breath.  Patient is complaining of occasional palpitations which last less than 1 minute at a time.  Patient had Holter monitor showing PVCs.  Patient had an echo 2/7/2018 which showed EF of 60-65%.     Patient's arterial blood pressure is 138/59, heart rate 50, O2 sat of 94% on room air.  BMI is over 35.     Patient had echocardiogram 2/27/2018 which was normal and treadmill stress test which was negative. 1 month event monitor was unremarkable.  Continues to smoke in spite of counseling  Labs from 3/12/2021 reveal cholesterol 276 triglycerides 107 HDL 48 , CMP normal.  Labs from 3/30/2022 reveal CMP which was normal.  Lipid profile with cholesterol 261, triglycerides 94, HDL 54, .  Labs from 4/4/2023 revealed total cholesterol 217, triglycerides 121, HDL 59, , normal CMP      ASSESSMENT:     -palpitation  -Dyslipidemia  -Obesity with BMI over 30  - hypertension     PLAN:    Reviewed Holter results with patient and her .  Continue beta-blockers for blood pressure control and palpitations  As stated take breakthrough extra metoprolol if heart palpitations are frequent or prolonged.  Discussed about Apple Watch versus [employer] mobile cruz.  Patient states he will palpitations do not last more than a minute.  We will continue statins since patient has multiple risk factors.  Reviewed BMI over 30, counseled on weight loss diet and exercise.  Discussed about the  creasing stimulus to decrease palpitations                     Procedures  3/17/2024 EKG reviewed/interpreted by me reveals sinus rhythm with rate of 60 bpm    Copied text in this portion of the note has been reviewed and is accurate as of 4/7/2024  The following portions of the patient's history were reviewed and updated as appropriate: allergies, current medications, past family history, past medical history, past social history, past surgical history and problem list.    Assessment:         MDM      No diagnosis found.       Plan:               Past Medical History:  Past Medical History:   Diagnosis Date    Dyslipidemia     Palpitation      Past Surgical History:  Past Surgical History:   Procedure Laterality Date    COSMETIC SURGERY      tummy tuck    TONSILLECTOMY        Allergies:  Allergies   Allergen Reactions    Penicillin G Rash    Sulfa Antibiotics Rash     Home Meds:  Current Meds:     Current Outpatient Medications:     atorvastatin (LIPITOR) 10 MG tablet, Take 1 tablet by mouth Daily., Disp: 90 tablet, Rfl: 3    cetirizine (zyrTEC) 10 MG tablet, Daily., Disp: , Rfl:     fluticasone (FLONASE) 50 MCG/ACT nasal spray, FLONASE ALLERGY RELIEF 50 MCG/ACT SUSP, Disp: , Rfl:     metoprolol succinate XL (TOPROL-XL) 25 MG 24 hr tablet, TAKE 1 TABLET BY MOUTH EVERY DAY, Disp: 90 tablet, Rfl: 3    vitamin C (ASCORBIC ACID) 250 MG tablet, Take 1 tablet by mouth Daily., Disp: , Rfl:   Social History:   Social History     Tobacco Use    Smoking status: Every Day     Types: Cigarettes    Smokeless tobacco: Never   Substance Use Topics    Alcohol use: Not on file      Family History:  Family History   Problem Relation Age of Onset    Heart disease Maternal Grandfather     Heart disease Paternal Grandmother               Review of Systems   Constitutional: Negative for malaise/fatigue.   Cardiovascular:  Positive for palpitations. Negative for chest pain and leg swelling.   Respiratory:  Negative for shortness of  "breath.    Skin:  Negative for rash.   Neurological:  Negative for dizziness, light-headedness and numbness.   All other systems are negative         Objective:     Physical Exam  LMP 03/04/2024   General:  Appears in no acute distress  Eyes: Sclera is anicteric,  conjunctiva is clear   HEENT:  No JVD.  No carotid bruits  Respiratory: Respirations regular and unlabored at rest.  Clear to auscultation  Cardiovascular: S1,S2 Regular rate and rhythm. .   Extremities: No digital clubbing or cyanosis, no edema  Skin: Color pink. Skin warm and dry to touch. No rashes  No xanthoma  Neuro: Alert and awake.    Lab Reviewed:         Alejandro Shepard MD  4/7/2024 14:05 EDT      EMR Dragon/Transcription:   \"Dictated utilizing Dragon dictation\".        "

## 2024-04-09 ENCOUNTER — OFFICE VISIT (OUTPATIENT)
Dept: CARDIOLOGY | Facility: CLINIC | Age: 54
End: 2024-04-09
Payer: COMMERCIAL

## 2024-04-09 VITALS
DIASTOLIC BLOOD PRESSURE: 59 MMHG | OXYGEN SATURATION: 94 % | WEIGHT: 213 LBS | HEIGHT: 65 IN | HEART RATE: 50 BPM | BODY MASS INDEX: 35.49 KG/M2 | SYSTOLIC BLOOD PRESSURE: 138 MMHG

## 2024-04-09 DIAGNOSIS — I49.3 PVC (PREMATURE VENTRICULAR CONTRACTION): ICD-10-CM

## 2024-04-09 DIAGNOSIS — R00.1 BRADYCARDIA: ICD-10-CM

## 2024-04-09 DIAGNOSIS — R00.2 PALPITATIONS: Primary | ICD-10-CM

## 2024-04-09 DIAGNOSIS — E66.9 OBESITY (BMI 30-39.9): ICD-10-CM

## 2024-04-09 PROCEDURE — 99214 OFFICE O/P EST MOD 30 MIN: CPT | Performed by: INTERNAL MEDICINE

## 2024-04-10 RX ORDER — ATORVASTATIN CALCIUM 10 MG/1
10 TABLET, FILM COATED ORAL DAILY
Qty: 90 TABLET | Refills: 3 | Status: SHIPPED | OUTPATIENT
Start: 2024-04-10

## 2024-07-11 RX ORDER — METOPROLOL SUCCINATE 25 MG/1
TABLET, EXTENDED RELEASE ORAL
Qty: 90 TABLET | Refills: 3 | Status: SHIPPED | OUTPATIENT
Start: 2024-07-11

## 2024-07-11 NOTE — TELEPHONE ENCOUNTER
Rx Refill Note  Requested Prescriptions     Pending Prescriptions Disp Refills    metoprolol succinate XL (TOPROL-XL) 25 MG 24 hr tablet [Pharmacy Med Name: METOPROLOL SUCC ER 25 MG TAB] 90 tablet 3     Sig: TAKE 1 TABLET BY MOUTH EVERY DAY      Last office visit with prescribing clinician: 4/9/2024   Last telemedicine visit with prescribing clinician: Visit date not found   Next office visit with prescribing clinician: 4/15/2025                         Would you like a call back once the refill request has been completed: [] Yes [] No    If the office needs to give you a call back, can they leave a voicemail: [] Yes [] No    Delia Paul MA  07/11/24, 07:26 EDT

## 2025-04-08 ENCOUNTER — TELEPHONE (OUTPATIENT)
Dept: CARDIOLOGY | Facility: CLINIC | Age: 55
End: 2025-04-08
Payer: MEDICAID

## 2025-04-08 DIAGNOSIS — I49.3 PVC (PREMATURE VENTRICULAR CONTRACTION): ICD-10-CM

## 2025-04-08 DIAGNOSIS — E78.5 HYPERLIPIDEMIA, UNSPECIFIED HYPERLIPIDEMIA TYPE: ICD-10-CM

## 2025-04-08 DIAGNOSIS — R00.2 PALPITATIONS: Primary | ICD-10-CM

## 2025-04-08 DIAGNOSIS — I10 HYPERTENSION, UNSPECIFIED TYPE: ICD-10-CM

## 2025-04-08 DIAGNOSIS — R00.1 BRADYCARDIA: ICD-10-CM

## 2025-04-08 RX ORDER — ATORVASTATIN CALCIUM 10 MG/1
10 TABLET, FILM COATED ORAL DAILY
Qty: 90 TABLET | Refills: 0 | Status: SHIPPED | OUTPATIENT
Start: 2025-04-08

## 2025-04-08 NOTE — TELEPHONE ENCOUNTER
Rx Refill Note  Requested Prescriptions     Signed Prescriptions Disp Refills    atorvastatin (LIPITOR) 10 MG tablet 90 tablet 0     Sig: TAKE 1 TABLET BY MOUTH EVERY DAY     Authorizing Provider: SHIVANI LAZAR     Ordering User: RICKY NORMAN      Last office visit with prescribing clinician: 4/9/2024   Last telemedicine visit with prescribing clinician: Visit date not found   Next office visit with prescribing clinician: 4/15/2025                         Would you like a call back once the refill request has been completed: [] Yes [] No    If the office needs to give you a call back, can they leave a voicemail: [] Yes [] No    Ricky Norman MA  04/08/25, 10:16 EDT

## 2025-04-08 NOTE — TELEPHONE ENCOUNTER
Pt contacted, she states that she will complete blood work at [facility] in [place]. Orders faxed.

## 2025-04-12 LAB
ALBUMIN SERPL-MCNC: 4.3 G/DL (ref 3.8–4.9)
ALP SERPL-CCNC: 72 IU/L (ref 44–121)
ALT SERPL-CCNC: 19 IU/L (ref 0–32)
AMBIG ABBREV CMP14 DEFAULT: NORMAL
AMBIG ABBREV LP DEFAULT: NORMAL
AST SERPL-CCNC: 20 IU/L (ref 0–40)
BILIRUB SERPL-MCNC: 0.3 MG/DL (ref 0–1.2)
BUN SERPL-MCNC: 9 MG/DL (ref 6–24)
BUN/CREAT SERPL: 13 (ref 9–23)
CALCIUM SERPL-MCNC: 9.9 MG/DL (ref 8.7–10.2)
CHLORIDE SERPL-SCNC: 104 MMOL/L (ref 96–106)
CHOLEST SERPL-MCNC: 228 MG/DL (ref 100–199)
CO2 SERPL-SCNC: 22 MMOL/L (ref 20–29)
CREAT SERPL-MCNC: 0.7 MG/DL (ref 0.57–1)
EGFRCR SERPLBLD CKD-EPI 2021: 104 ML/MIN/1.73
GLOBULIN SER CALC-MCNC: 2.7 G/DL (ref 1.5–4.5)
GLUCOSE SERPL-MCNC: 107 MG/DL (ref 70–99)
HDLC SERPL-MCNC: 62 MG/DL
LDLC SERPL CALC-MCNC: 149 MG/DL (ref 0–99)
POTASSIUM SERPL-SCNC: 5.3 MMOL/L (ref 3.5–5.2)
PROT SERPL-MCNC: 7 G/DL (ref 6–8.5)
SODIUM SERPL-SCNC: 141 MMOL/L (ref 134–144)
TRIGL SERPL-MCNC: 94 MG/DL (ref 0–149)
TSH SERPL DL<=0.005 MIU/L-ACNC: 2.42 UIU/ML (ref 0.45–4.5)
VLDLC SERPL CALC-MCNC: 17 MG/DL (ref 5–40)

## 2025-04-13 NOTE — PROGRESS NOTES
Subjective:     Encounter Date:04/15/2025      Patient ID: Ajay Zuniga is a 54 y.o. female.    Chief Complaint and history of present illness:     Follow-up for hypertension, hyperlipidemia, obesity with BMI over 30     History of Present Illness  :     Ms. Ajay Hoffman has PMH     # Palpitation  #Hypertension,  # hyperlipidemia  #  allergy to penicillin and sulfa  # Former cigarette smoker  #  tonsillectomy and tummy tuck            here for 1 year follow-up..  Patient denies any chest pain or shortness of breath.  Patient has occasional palpitations which she noticed in February.     Patient's arterial blood pressure is 171/75, heart rate 69 bpm, O2 sat of 96% on room air.  BMI is over 40.  States home blood pressure was 130/75.     Patient had echocardiogram 2/27/2018 which was normal and treadmill stress test which was negative. 1 month event monitor was unremarkable.  Continues to smoke in spite of counseling  Labs from 3/12/2021 reveal cholesterol 276 triglycerides 107 HDL 48 , CMP normal.  Labs from 3/30/2022 reveal CMP which was normal.  Lipid profile with cholesterol 261, triglycerides 94, HDL 54, .  Labs from 4/4/2023 revealed total cholesterol 217, triglycerides 121, HDL 59, , normal CMP.  Labs from 4/11/2025 reveal normal TSH, CMP with a glucose of 107, potassium 5.3.  Lipid profile with cholesterol 228, triglycerides 194, HDL 62, .      ASSESSMENT:     -Hypertension  -Dyslipidemia  -Obesity with BMI over 30  - Hyperkalemia     PLAN:     Reviewed EKG results with patient.  Patient blood pressure is elevated.  And labs revealed elevated potassium.  I suspect it is hemolyzed specimen.  Will change metoprolol to bisoprolol hydrochlorothiazide.  Patient's cholesterol is not to goal.  Will increase Lipitor to 40 mg.  Will continue medical management with bisoprolol hydrochlorothiazide and atorvastatin to help with hypertension and dyslipidemia.  Will  check labs before visit.  Reviewed BMI over 40, counseled on weight loss diet and exercise.  Patient is not on antiplatelet therapy because she does not have any ischemic heart disease.        ECG 12 Lead    Date/Time: 4/15/2025 9:49 AM  Performed by: Alejandro Shepard MD    Authorized by: Alejandro Shepard MD  Comparison: compared with previous ECG from 3/17/2024  Comparison to previous ECG: EKG done today reviewed/interpreted by me reveals sinus rhythm with rate of 69 bpm, no significant change compared to EKG from 3/17/2024          ECHOCARDIOGRAM:      STRESS TEST          HEART CATHETERIZATION  No results found for this or any previous visit.      Copied text in this portion of the note has been reviewed and is accurate as of 4/15/2025  The following portions of the patient's history were reviewed and updated as appropriate: allergies, current medications, past family history, past medical history, past social history, past surgical history and problem list.    Assessment:         MDM       Diagnosis Plan   1. Essential hypertension  ECG 12 Lead    Lipid Panel    Comprehensive Metabolic Panel      2. Dyslipidemia  ECG 12 Lead    Lipid Panel    Comprehensive Metabolic Panel      3. Morbid obesity with BMI of 40.0-44.9, adult  ECG 12 Lead    Lipid Panel    Comprehensive Metabolic Panel      4. Hyperkalemia               Plan:               Past Medical History:  Past Medical History:   Diagnosis Date    Dyslipidemia     Palpitation      Past Surgical History:  Past Surgical History:   Procedure Laterality Date    COSMETIC SURGERY      tummy tuck    TONSILLECTOMY        Allergies:  Allergies   Allergen Reactions    Penicillin G Rash    Sulfa Antibiotics Rash     Home Meds:  Current Meds:     Current Outpatient Medications:     atorvastatin (LIPITOR) 40 MG tablet, Take 1 tablet by mouth Daily., Disp: 90 tablet, Rfl: 3    cetirizine (zyrTEC) 10 MG tablet, Daily., Disp: , Rfl:     fluticasone  "(FLONASE) 50 MCG/ACT nasal spray, FLONASE ALLERGY RELIEF 50 MCG/ACT SUSP, Disp: , Rfl:     bisoprolol-hydrochlorothiazide (Ziac) 10-6.25 MG per tablet, Take 1 tablet by mouth Daily., Disp: 90 tablet, Rfl: 3    vitamin C (ASCORBIC ACID) 250 MG tablet, Take 1 tablet by mouth Daily., Disp: , Rfl:   Social History:   Social History     Tobacco Use    Smoking status: Former     Types: Cigarettes     Passive exposure: Past    Smokeless tobacco: Never   Substance Use Topics    Alcohol use: Yes     Comment: 0-3      Family History:  Family History   Problem Relation Age of Onset    Heart disease Maternal Grandfather     Heart disease Paternal Grandmother               Review of Systems   Constitutional: Negative for malaise/fatigue.   Cardiovascular:  Negative for chest pain, leg swelling and palpitations.   Respiratory:  Negative for shortness of breath.    Skin:  Negative for rash.   Neurological:  Negative for dizziness, light-headedness and numbness.     All other systems are negative         Objective:     Physical Exam  /75   Pulse 69   Ht 165.1 cm (65\")   Wt 111 kg (245 lb)   SpO2 96%   BMI 40.77 kg/m²   General:  Appears in no acute distress  Eyes: Sclera is anicteric,  conjunctiva is clear   HEENT:  No JVD.  No carotid bruits  Respiratory: Respirations regular and unlabored at rest.  Clear to auscultation  Cardiovascular: S1,S2 Regular rate and rhythm. .   Extremities: No digital clubbing or cyanosis, no edema  Skin: Color pink. Skin warm and dry to touch. No rashes  No xanthoma  Neuro: Alert and awake.    Lab Reviewed:         Alejandro Shepard MD  4/15/2025 11:23 EDT      EMR Dragon/Transcription:   \"Dictated utilizing Dragon dictation\".        "

## 2025-04-15 ENCOUNTER — OFFICE VISIT (OUTPATIENT)
Dept: CARDIOLOGY | Facility: CLINIC | Age: 55
End: 2025-04-15
Payer: MEDICAID

## 2025-04-15 VITALS
BODY MASS INDEX: 40.82 KG/M2 | WEIGHT: 245 LBS | HEART RATE: 69 BPM | OXYGEN SATURATION: 96 % | HEIGHT: 65 IN | SYSTOLIC BLOOD PRESSURE: 171 MMHG | DIASTOLIC BLOOD PRESSURE: 75 MMHG

## 2025-04-15 DIAGNOSIS — E87.5 HYPERKALEMIA: ICD-10-CM

## 2025-04-15 DIAGNOSIS — I10 ESSENTIAL HYPERTENSION: Primary | ICD-10-CM

## 2025-04-15 DIAGNOSIS — E78.5 DYSLIPIDEMIA: ICD-10-CM

## 2025-04-15 DIAGNOSIS — E66.01 MORBID OBESITY WITH BMI OF 40.0-44.9, ADULT: ICD-10-CM

## 2025-04-15 PROCEDURE — 1159F MED LIST DOCD IN RCRD: CPT | Performed by: INTERNAL MEDICINE

## 2025-04-15 PROCEDURE — 1160F RVW MEDS BY RX/DR IN RCRD: CPT | Performed by: INTERNAL MEDICINE

## 2025-04-15 PROCEDURE — 99214 OFFICE O/P EST MOD 30 MIN: CPT | Performed by: INTERNAL MEDICINE

## 2025-04-15 PROCEDURE — 93000 ELECTROCARDIOGRAM COMPLETE: CPT | Performed by: INTERNAL MEDICINE

## 2025-04-15 RX ORDER — ATORVASTATIN CALCIUM 40 MG/1
40 TABLET, FILM COATED ORAL DAILY
Qty: 90 TABLET | Refills: 3 | Status: SHIPPED | OUTPATIENT
Start: 2025-04-15

## 2025-04-15 RX ORDER — BISOPROLOL FUMARATE AND HYDROCHLOROTHIAZIDE 10; 6.25 MG/1; MG/1
1 TABLET ORAL DAILY
Qty: 90 TABLET | Refills: 3 | Status: SHIPPED | OUTPATIENT
Start: 2025-04-15

## 2025-07-07 RX ORDER — ATORVASTATIN CALCIUM 10 MG/1
10 TABLET, FILM COATED ORAL DAILY
Qty: 90 TABLET | Refills: 0 | OUTPATIENT
Start: 2025-07-07

## 2025-07-07 RX ORDER — METOPROLOL SUCCINATE 25 MG/1
25 TABLET, EXTENDED RELEASE ORAL DAILY
Qty: 90 TABLET | Refills: 3 | OUTPATIENT
Start: 2025-07-07